# Patient Record
Sex: FEMALE | Race: OTHER | NOT HISPANIC OR LATINO
[De-identification: names, ages, dates, MRNs, and addresses within clinical notes are randomized per-mention and may not be internally consistent; named-entity substitution may affect disease eponyms.]

---

## 2018-04-10 ENCOUNTER — TRANSCRIPTION ENCOUNTER (OUTPATIENT)
Age: 43
End: 2018-04-10

## 2018-11-28 ENCOUNTER — EMERGENCY (EMERGENCY)
Facility: HOSPITAL | Age: 43
LOS: 1 days | Discharge: HOME | End: 2018-11-28
Attending: EMERGENCY MEDICINE
Payer: COMMERCIAL

## 2018-11-28 VITALS
RESPIRATION RATE: 16 BRPM | OXYGEN SATURATION: 100 % | TEMPERATURE: 98 F | WEIGHT: 156.09 LBS | HEART RATE: 75 BPM | SYSTOLIC BLOOD PRESSURE: 109 MMHG | DIASTOLIC BLOOD PRESSURE: 71 MMHG

## 2018-11-28 PROCEDURE — 99284 EMERGENCY DEPT VISIT MOD MDM: CPT | Mod: 25

## 2018-11-28 PROCEDURE — 99053 MED SERV 10PM-8AM 24 HR FAC: CPT
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
## 2018-11-29 PROCEDURE — 99284 EMERGENCY DEPT VISIT MOD MDM: CPT | Mod: 25

## 2018-11-29 PROCEDURE — 72125 CT NECK SPINE W/O DYE: CPT | Mod: 26

## 2018-11-29 PROCEDURE — 73562 X-RAY EXAM OF KNEE 3: CPT

## 2018-11-29 PROCEDURE — 73562 X-RAY EXAM OF KNEE 3: CPT | Mod: 26,RT

## 2018-11-29 PROCEDURE — 72125 CT NECK SPINE W/O DYE: CPT

## 2018-11-29 RX ORDER — IBUPROFEN 200 MG
400 TABLET ORAL ONCE
Qty: 0 | Refills: 0 | Status: COMPLETED | OUTPATIENT
Start: 2018-11-29 | End: 2018-11-29

## 2018-11-29 RX ADMIN — Medication 400 MILLIGRAM(S): at 00:15

## 2018-11-29 NOTE — ED PROVIDER NOTE - PHYSICAL EXAMINATION
GENERAL: AAOx4, GCS 15, NAD, WDWN; HEENT: MMM, no jugular venous distension, mild c-spine tender to palpation midline c6/7, also at L paracervical musculature.  Mild TTP at L trap, PERRLA, EOMI, nonicteric sclera; PULM: CTA B, no crackles/rubs/rales; CV: RRR, S1S2, no MRG; ABD: Flat abdomen, NTND, no R/G/R, no CVAT.  MSK: SMITH, +2 pulses x4;  NEURO: No obvious focal deficits; PSYCH: AAOx3, clear thought and normal sensorium.
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
## 2018-11-29 NOTE — ED ADULT NURSE NOTE - CHPI ED NUR SYMPTOMS NEG
no difficulty bearing weight/no disorientation/no fussiness/no bruising/no sleeping issues/no crying/no decreased eating/drinking/no dizziness/no loss of consciousness/no laceration/no acting out behaviors

## 2019-03-25 PROBLEM — Z00.00 ENCOUNTER FOR PREVENTIVE HEALTH EXAMINATION: Status: ACTIVE | Noted: 2019-03-25

## 2019-04-10 ENCOUNTER — APPOINTMENT (OUTPATIENT)
Dept: NEUROLOGY | Facility: CLINIC | Age: 44
End: 2019-04-10
Payer: COMMERCIAL

## 2019-04-10 VITALS
BODY MASS INDEX: 24.43 KG/M2 | DIASTOLIC BLOOD PRESSURE: 68 MMHG | WEIGHT: 152 LBS | HEIGHT: 66 IN | HEART RATE: 85 BPM | SYSTOLIC BLOOD PRESSURE: 120 MMHG

## 2019-04-10 DIAGNOSIS — G43.709 CHRONIC MIGRAINE W/OUT AURA, NOT INTRACTABLE, W/OUT STATUS MIGRAINOSUS: ICD-10-CM

## 2019-04-10 DIAGNOSIS — R51 HEADACHE: ICD-10-CM

## 2019-04-10 DIAGNOSIS — G24.3 SPASMODIC TORTICOLLIS: ICD-10-CM

## 2019-04-10 PROCEDURE — 99204 OFFICE O/P NEW MOD 45 MIN: CPT

## 2019-04-10 NOTE — PHYSICAL EXAM
[Oriented To Time, Place, And Person] : oriented to person, place, and time [General Appearance - In No Acute Distress] : in no acute distress [General Appearance - Alert] : alert [Person] : oriented to person [Place] : oriented to place [Impaired Insight] : insight and judgment were intact [Visual Intact] : visual attention was ~T not ~L decreased [Time] : oriented to time [Comprehension] : comprehension intact [Fluency] : fluency intact [Writing A Sentence] : no difficulty writing a sentence [Past History] : adequate knowledge of personal past history [Reading] : reading intact [Current Events] : adequate knowledge of current events [Vocabulary] : adequate range of vocabulary [Cranial Nerves Oculomotor (III)] : extraocular motion intact [Cranial Nerves Optic (II)] : visual acuity intact bilaterally,  visual fields full to confrontation, pupils equal round and reactive to light [Cranial Nerves Vestibulocochlear (VIII)] : hearing was intact bilaterally [Cranial Nerves Facial (VII)] : face symmetrical [Cranial Nerves Trigeminal (V)] : facial sensation intact symmetrically [Cranial Nerves Glossopharyngeal (IX)] : tongue and palate midline [Cranial Nerves Accessory (XI - Cranial And Spinal)] : head turning and shoulder shrug symmetric [Motor Tone] : muscle tone was normal in all four extremities [Cranial Nerves Hypoglossal (XII)] : there was no tongue deviation with protrusion [Involuntary Movements] : no involuntary movements were seen [Motor Handedness Right-Handed] : the patient is right hand dominant [Motor Strength] : muscle strength was normal in all four extremities [Sensation Tactile Decrease] : light touch was intact [Paresis Pronator Drift Right-Sided] : no pronator drift on the right [Paresis Pronator Drift Left-Sided] : no pronator drift on the left [Abnormal Walk] : normal gait [Sensation Pain / Temperature Decrease] : pain and temperature was intact [Limited Balance] : the patient's balance was impaired [Dysdiadochokinesia Bilaterally] : not present [Coordination - Dysmetria Impaired Finger-to-Nose Bilateral] : not present [2+] : Ankle jerk left 2+ [Sclera] : the sclera and conjunctiva were normal [PERRL With Normal Accommodation] : pupils were equal in size, round, reactive to light, with normal accommodation [Hearing Threshold Finger Rub Not Starr] : hearing was normal [Outer Ear] : the ears and nose were normal in appearance [Extraocular Movements] : extraocular movements were intact [Arterial Pulses Carotid] : carotid pulses were normal with no bruits [Edema] : there was no peripheral edema [Veins - Varicosity Changes] : there were no varicosital changes [FreeTextEntry1] : severely limited range of motion of all directions,  diffusely tender to palpation. No neck, slight, and dural, lateral right shift.

## 2019-04-10 NOTE — REVIEW OF SYSTEMS
[Neck Pain] : neck pain [As Noted in HPI] : as noted in HPI [de-identified] : intermittent numbness of both hands, especially at night [Negative] : Heme/Lymph
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
## 2019-04-10 NOTE — ASSESSMENT
[FreeTextEntry1] : 43-year-old woman with daily headaches, severe neck pain, limitation of range of motion. History of headaches, now increasing.\par Possibly transform migraines, questionable cervicogenic headaches. Rule out intracranial process.\par Spasmodic torticollis.\par Plan: Obtain MRI of the brain without contrast.\par Start Pamelor 25 mg at bedtime.\par Maxalt 10 mg p.o. p.r.n. headache, can repeat in 2 hours.\par Request authorization for Botox 200 units, every 3 months, injected. Muscles of the head bilaterally, for torticollis.

## 2019-05-03 ENCOUNTER — TRANSCRIPTION ENCOUNTER (OUTPATIENT)
Age: 44
End: 2019-05-03

## 2019-05-07 ENCOUNTER — APPOINTMENT (OUTPATIENT)
Dept: NEUROLOGY | Facility: CLINIC | Age: 44
End: 2019-05-07

## 2019-05-15 ENCOUNTER — APPOINTMENT (OUTPATIENT)
Dept: NEUROLOGY | Facility: CLINIC | Age: 44
End: 2019-05-15
Payer: COMMERCIAL

## 2019-05-15 PROCEDURE — 64615 CHEMODENERV MUSC MIGRAINE: CPT

## 2019-05-15 NOTE — HISTORY OF PRESENT ILLNESS
[FreeTextEntry1] : 43-year-old woman with a history of chronic migraines, here for first schedule Botox treatment.

## 2019-05-15 NOTE — PHYSICAL EXAM
[General Appearance - In No Acute Distress] : in no acute distress [General Appearance - Alert] : alert [Person] : oriented to person [Time] : oriented to time [Place] : oriented to place [Fluency] : fluency intact [Visual Intact] : visual attention was ~T not ~L decreased [Writing A Sentence] : no difficulty writing a sentence [Current Events] : adequate knowledge of current events [Comprehension] : comprehension intact [Reading] : reading intact [Vocabulary] : adequate range of vocabulary [Cranial Nerves Optic (II)] : visual acuity intact bilaterally,  visual fields full to confrontation, pupils equal round and reactive to light [Past History] : adequate knowledge of personal past history [Cranial Nerves Facial (VII)] : face symmetrical [Cranial Nerves Oculomotor (III)] : extraocular motion intact [Cranial Nerves Trigeminal (V)] : facial sensation intact symmetrically [Cranial Nerves Glossopharyngeal (IX)] : tongue and palate midline [Cranial Nerves Accessory (XI - Cranial And Spinal)] : head turning and shoulder shrug symmetric [Cranial Nerves Vestibulocochlear (VIII)] : hearing was intact bilaterally [Motor Strength] : muscle strength was normal in all four extremities [Cranial Nerves Hypoglossal (XII)] : there was no tongue deviation with protrusion [Motor Tone] : muscle tone was normal in all four extremities [Motor Handedness Right-Handed] : the patient is right hand dominant [Involuntary Movements] : no involuntary movements were seen [Paresis Pronator Drift Right-Sided] : no pronator drift on the right [Sensation Pain / Temperature Decrease] : pain and temperature was intact [Sensation Tactile Decrease] : light touch was intact [Paresis Pronator Drift Left-Sided] : no pronator drift on the left [Abnormal Walk] : normal gait [Dysdiadochokinesia Bilaterally] : not present [Limited Balance] : the patient's balance was impaired [Coordination - Dysmetria Impaired Finger-to-Nose Bilateral] : not present [2+] : Ankle jerk left 2+

## 2019-05-15 NOTE — ASSESSMENT
[FreeTextEntry1] : 43-year-old woman with a history of chronic migraines, status post first Botox treatment.\par Reviewed and discussed present management.\par Advice to maintain a headache diary.\par Scheduled next Botox treatment in 3 months

## 2019-06-03 ENCOUNTER — APPOINTMENT (OUTPATIENT)
Dept: NEUROLOGY | Facility: CLINIC | Age: 44
End: 2019-06-03
Payer: COMMERCIAL

## 2019-06-03 VITALS
BODY MASS INDEX: 24.43 KG/M2 | HEIGHT: 66 IN | HEART RATE: 84 BPM | SYSTOLIC BLOOD PRESSURE: 118 MMHG | DIASTOLIC BLOOD PRESSURE: 72 MMHG | WEIGHT: 152 LBS

## 2019-06-03 DIAGNOSIS — G89.29 CERVICALGIA: ICD-10-CM

## 2019-06-03 DIAGNOSIS — M54.2 CERVICALGIA: ICD-10-CM

## 2019-06-03 PROCEDURE — 20553 NJX 1/MLT TRIGGER POINTS 3/>: CPT

## 2019-06-03 PROCEDURE — 99213 OFFICE O/P EST LOW 20 MIN: CPT | Mod: 25

## 2019-06-03 NOTE — PROCEDURE
[FreeTextEntry1] : trigger point injections performed after obtaining verbal consent. A mixture of lidocaine 1% with epinephrine 2 cc, Kenalog 40 mg per cc, 0.5 cc. Using aseptic technique. 0.25 cc injected into the right upper middle trapezius, levator, scapula, right rhomboid.\par Tolerated well. No complications noted.

## 2019-06-03 NOTE — ASSESSMENT
[FreeTextEntry1] : 43-year-old woman with chronic headaches, status post Botox, we'll reevaluate in 2 months.\par Chronic neck pain, stiffness, spasm.myofascial pain syndrome.\par We'll refer to physical therapy for massage therapy.\par Performed trigger point injection, per reports obtained.

## 2019-06-03 NOTE — HISTORY OF PRESENT ILLNESS
[FreeTextEntry1] : 43-year-old woman, history of chronic migraines, chronic neck pain. MRIs of the brain was unremarkable, MRI of the cervical spine demonstrated multilevel degenerative disc disease.\par Had Botox for the first time 15 days ago,headaches show mildly improved, neck pain, though still persists, more on the right and left Radiates into the shoulder area.

## 2019-06-03 NOTE — PHYSICAL EXAM
[General Appearance - Alert] : alert [General Appearance - In No Acute Distress] : in no acute distress [Person] : oriented to person [Time] : oriented to time [Place] : oriented to place [Comprehension] : comprehension intact [Fluency] : fluency intact [Past History] : adequate knowledge of personal past history [Current Events] : adequate knowledge of current events [Cranial Nerves Oculomotor (III)] : extraocular motion intact [Cranial Nerves Optic (II)] : visual acuity intact bilaterally,  visual fields full to confrontation, pupils equal round and reactive to light [Vocabulary] : adequate range of vocabulary [Cranial Nerves Trigeminal (V)] : facial sensation intact symmetrically [Cranial Nerves Vestibulocochlear (VIII)] : hearing was intact bilaterally [Cranial Nerves Facial (VII)] : face symmetrical [Cranial Nerves Glossopharyngeal (IX)] : tongue and palate midline [Cranial Nerves Accessory (XI - Cranial And Spinal)] : head turning and shoulder shrug symmetric [Cranial Nerves Hypoglossal (XII)] : there was no tongue deviation with protrusion [Motor Tone] : muscle tone was normal in all four extremities [Motor Strength] : muscle strength was normal in all four extremities [Paresis Pronator Drift Right-Sided] : no pronator drift on the right [Involuntary Movements] : no involuntary movements were seen [Motor Handedness Right-Handed] : the patient is right hand dominant [Sensation Tactile Decrease] : light touch was intact [Paresis Pronator Drift Left-Sided] : no pronator drift on the left [Sensation Pain / Temperature Decrease] : pain and temperature was intact [Abnormal Walk] : normal gait [Coordination - Dysmetria Impaired Finger-to-Nose Bilateral] : not present [Dysdiadochokinesia Bilaterally] : not present [Limited Balance] : the patient's balance was impaired [2+] : Ankle jerk left 2+ [FreeTextEntry1] : diffuse muscle tightness and spasm across the upper back, patient in the rightupper, middle trapezius, levator, scapula, rhomboid.

## 2019-06-11 ENCOUNTER — APPOINTMENT (OUTPATIENT)
Dept: NEUROLOGY | Facility: CLINIC | Age: 44
End: 2019-06-11
Payer: COMMERCIAL

## 2019-06-11 VITALS
BODY MASS INDEX: 24.91 KG/M2 | DIASTOLIC BLOOD PRESSURE: 68 MMHG | WEIGHT: 155 LBS | SYSTOLIC BLOOD PRESSURE: 110 MMHG | HEART RATE: 75 BPM | HEIGHT: 66 IN

## 2019-06-11 PROCEDURE — 20553 NJX 1/MLT TRIGGER POINTS 3/>: CPT

## 2019-06-11 PROCEDURE — 99214 OFFICE O/P EST MOD 30 MIN: CPT | Mod: 25

## 2019-06-11 RX ORDER — CYCLOBENZAPRINE HYDROCHLORIDE 5 MG/1
5 TABLET, FILM COATED ORAL
Qty: 15 | Refills: 0 | Status: ACTIVE | COMMUNITY
Start: 2019-06-11 | End: 1900-01-01

## 2019-06-11 RX ORDER — MELOXICAM 7.5 MG/1
7.5 TABLET ORAL DAILY
Qty: 60 | Refills: 0 | Status: ACTIVE | COMMUNITY
Start: 2019-06-11 | End: 1900-01-01

## 2019-06-11 NOTE — DISCUSSION/SUMMARY
[FreeTextEntry1] : 43 year old woman c/o worsening right upper back, neck pain. Spasm.\par Plan: MR cervical spine r/o worsening  DDD, spondylosis.\par Mobic 7.5 mg, 1-2 tabs daily\par Flexeril 5 mg po HS\par Trigger point injections right upper back. Patient gives her consent.\par \par

## 2019-06-11 NOTE — PHYSICAL EXAM
[General Appearance - In No Acute Distress] : in no acute distress [General Appearance - Alert] : alert [Cranial Nerves Optic (II)] : visual acuity intact bilaterally,  visual fields full to confrontation, pupils equal round and reactive to light [Cranial Nerves Oculomotor (III)] : extraocular motion intact [Cranial Nerves Trigeminal (V)] : facial sensation intact symmetrically [Cranial Nerves Facial (VII)] : face symmetrical [Cranial Nerves Vestibulocochlear (VIII)] : hearing was intact bilaterally [Cranial Nerves Glossopharyngeal (IX)] : tongue and palate midline [Cranial Nerves Accessory (XI - Cranial And Spinal)] : head turning and shoulder shrug symmetric [Cranial Nerves Hypoglossal (XII)] : there was no tongue deviation with protrusion [Motor Strength] : muscle strength was normal in all four extremities [Motor Handedness Right-Handed] : the patient is right hand dominant [Normal] : Normal [C-Spine ___ (level)] : ~Ulevel [unfilled] cervical spine [Right Paraspinal ___ (level)] : ~Ulevel [unfilled] right paraspinal [Muscle Spasms, Left] : no left-sided muscle spasms [Muscle Spasms, Right] : right-sided muscle spasms [5] : C5 [7] : C7 [6] : C6 [Pain] : was painful [Restricted] : was restricted

## 2019-06-11 NOTE — HISTORY OF PRESENT ILLNESS
[FreeTextEntry1] : 43 year old woman hx of chronic migraine, chronic neck pain, recently had Botox treatment for her pains. has persistent right upper back midline pain. Worse as he day progresses. Neck feels stiff. No fever or chills.\par Pain is worse with activity, bests after sleep. Non radiating to extremities, no difficulty walking, balance, no change in B/B habits.

## 2019-07-03 ENCOUNTER — APPOINTMENT (OUTPATIENT)
Dept: NEUROLOGY | Facility: CLINIC | Age: 44
End: 2019-07-03
Payer: COMMERCIAL

## 2019-07-03 VITALS
BODY MASS INDEX: 24.91 KG/M2 | SYSTOLIC BLOOD PRESSURE: 102 MMHG | HEIGHT: 66 IN | HEART RATE: 82 BPM | WEIGHT: 155 LBS | DIASTOLIC BLOOD PRESSURE: 56 MMHG

## 2019-07-03 PROCEDURE — 99213 OFFICE O/P EST LOW 20 MIN: CPT | Mod: 25

## 2019-07-03 PROCEDURE — 20553 NJX 1/MLT TRIGGER POINTS 3/>: CPT

## 2019-07-03 NOTE — PROCEDURE
[FreeTextEntry1] : Trigger point injection using aseptic technique, after obtaining verbal consent. A mixture of lidocaine 1% wo epi 2 cc plus Kenalog 40 mg/cc, 0.5 cc in the right elevator scapulae, upper and mid trapezius. Tolerated well.\par Bleeding controlled with local pressure.

## 2019-07-03 NOTE — ASSESSMENT
[FreeTextEntry1] : 43 year old woman hx of migraine headaches, doing better.\par Recurrent neck pain and stiffness. Improving with massage therapy.\par Discussed options. Will do trigger point injections.\par HAs f/u appointment in August.

## 2019-07-03 NOTE — PHYSICAL EXAM
[General Appearance - Alert] : alert [General Appearance - In No Acute Distress] : in no acute distress [Person] : oriented to person [Time] : oriented to time [Place] : oriented to place [Fluency] : fluency intact [Cranial Nerves Oculomotor (III)] : extraocular motion intact [Cranial Nerves Optic (II)] : visual acuity intact bilaterally,  visual fields full to confrontation, pupils equal round and reactive to light [Cranial Nerves Trigeminal (V)] : facial sensation intact symmetrically [Cranial Nerves Facial (VII)] : face symmetrical [Cranial Nerves Vestibulocochlear (VIII)] : hearing was intact bilaterally [Cranial Nerves Accessory (XI - Cranial And Spinal)] : head turning and shoulder shrug symmetric [Cranial Nerves Glossopharyngeal (IX)] : tongue and palate midline [Cranial Nerves Hypoglossal (XII)] : there was no tongue deviation with protrusion [Motor Strength] : muscle strength was normal in all four extremities [Motor Handedness Right-Handed] : the patient is right hand dominant [FreeTextEntry1] : muscle tightness and tenderness to palaption across the upper back, specially in the right upper, middle trapezius, levator, scapula, rhomboid.

## 2019-07-03 NOTE — HISTORY OF PRESENT ILLNESS
[FreeTextEntry1] : 33-year-old woman with history of lightheaded, cervicalgia, here for followup visit, reports headaches overall are better.Still having persistent neck pain, right shoulder pain and stiffness. Going to a massage therapist, with some results. No weakness of the upper extremities, denies pain radiating down the arms, no numbness, paresthesias of the upper extremities. No change in walking, no bowel or bladder changes.

## 2019-08-07 ENCOUNTER — APPOINTMENT (OUTPATIENT)
Dept: NEUROLOGY | Facility: CLINIC | Age: 44
End: 2019-08-07
Payer: COMMERCIAL

## 2019-08-07 PROCEDURE — 64615 CHEMODENERV MUSC MIGRAINE: CPT

## 2019-08-07 NOTE — HISTORY OF PRESENT ILLNESS
[FreeTextEntry1] : 43-year-old woman, history of chronic migraines, therefore schedule Botox treatment,reports since the first treatment in May, her headaches have significantly reduced. Tolerated procedure well.

## 2019-08-07 NOTE — PHYSICAL EXAM
[General Appearance - Alert] : alert [General Appearance - In No Acute Distress] : in no acute distress [Person] : oriented to person [Place] : oriented to place [Time] : oriented to time [Fluency] : fluency intact [Cranial Nerves Optic (II)] : visual acuity intact bilaterally,  visual fields full to confrontation, pupils equal round and reactive to light [Cranial Nerves Oculomotor (III)] : extraocular motion intact [Cranial Nerves Trigeminal (V)] : facial sensation intact symmetrically [Cranial Nerves Facial (VII)] : face symmetrical [Cranial Nerves Vestibulocochlear (VIII)] : hearing was intact bilaterally [Cranial Nerves Glossopharyngeal (IX)] : tongue and palate midline [Cranial Nerves Accessory (XI - Cranial And Spinal)] : head turning and shoulder shrug symmetric [Motor Strength] : muscle strength was normal in all four extremities [Cranial Nerves Hypoglossal (XII)] : there was no tongue deviation with protrusion [Motor Handedness Right-Handed] : the patient is right hand dominant

## 2019-08-07 NOTE — ASSESSMENT
[FreeTextEntry1] : 43-year-old woman, history of chronic migraines, doing very well on Botox treatment, has noticed significant reduction in headaches.\par Reviewed present management, no changes.\par Plan: Schedule next Botox treatment in 3 months.

## 2019-11-05 ENCOUNTER — APPOINTMENT (OUTPATIENT)
Dept: NEUROLOGY | Facility: CLINIC | Age: 44
End: 2019-11-05
Payer: COMMERCIAL

## 2019-11-05 PROCEDURE — 64615 CHEMODENERV MUSC MIGRAINE: CPT

## 2019-11-05 NOTE — HISTORY OF PRESENT ILLNESS
[FreeTextEntry1] : 44-year-old woman with a history of recurrent headaches migraines, here for schedule Botox treatment. Reports significant improvement of her headaches, less neck pain. Tolerated the procedure well in the past

## 2019-11-05 NOTE — PHYSICAL EXAM
[General Appearance - Alert] : alert [General Appearance - In No Acute Distress] : in no acute distress [Person] : oriented to person [Place] : oriented to place [Time] : oriented to time [Fluency] : fluency intact [Cranial Nerves Optic (II)] : visual acuity intact bilaterally,  visual fields full to confrontation, pupils equal round and reactive to light [Cranial Nerves Oculomotor (III)] : extraocular motion intact [Cranial Nerves Trigeminal (V)] : facial sensation intact symmetrically [Cranial Nerves Facial (VII)] : face symmetrical [Cranial Nerves Vestibulocochlear (VIII)] : hearing was intact bilaterally [Cranial Nerves Glossopharyngeal (IX)] : tongue and palate midline [Cranial Nerves Accessory (XI - Cranial And Spinal)] : head turning and shoulder shrug symmetric [Cranial Nerves Hypoglossal (XII)] : there was no tongue deviation with protrusion [Motor Strength] : muscle strength was normal in all four extremities [Motor Handedness Right-Handed] : the patient is right hand dominant

## 2019-11-05 NOTE — ASSESSMENT
[FreeTextEntry1] : 44-year-old woman history of chronic migraines, tolerated Botox well. Headaches improving.\par Plan: Schedule next treatment in 3 months.

## 2019-12-04 ENCOUNTER — OUTPATIENT (OUTPATIENT)
Dept: OUTPATIENT SERVICES | Facility: HOSPITAL | Age: 44
LOS: 1 days | End: 2019-12-04
Payer: COMMERCIAL

## 2019-12-04 ENCOUNTER — APPOINTMENT (OUTPATIENT)
Dept: MAMMOGRAPHY | Facility: CLINIC | Age: 44
End: 2019-12-04
Payer: COMMERCIAL

## 2019-12-04 DIAGNOSIS — Z00.00 ENCOUNTER FOR GENERAL ADULT MEDICAL EXAMINATION WITHOUT ABNORMAL FINDINGS: ICD-10-CM

## 2019-12-04 DIAGNOSIS — Z12.31 ENCOUNTER FOR SCREENING MAMMOGRAM FOR MALIGNANT NEOPLASM OF BREAST: ICD-10-CM

## 2019-12-04 PROCEDURE — 77063 BREAST TOMOSYNTHESIS BI: CPT

## 2019-12-04 PROCEDURE — 77063 BREAST TOMOSYNTHESIS BI: CPT | Mod: 26

## 2019-12-04 PROCEDURE — 77067 SCR MAMMO BI INCL CAD: CPT

## 2019-12-04 PROCEDURE — 77067 SCR MAMMO BI INCL CAD: CPT | Mod: 26

## 2020-02-19 ENCOUNTER — APPOINTMENT (OUTPATIENT)
Dept: NEUROLOGY | Facility: CLINIC | Age: 45
End: 2020-02-19
Payer: COMMERCIAL

## 2020-02-19 PROCEDURE — 64615 CHEMODENERV MUSC MIGRAINE: CPT

## 2020-02-19 NOTE — PHYSICAL EXAM
[General Appearance - In No Acute Distress] : in no acute distress [General Appearance - Alert] : alert [Oriented To Time, Place, And Person] : oriented to person, place, and time [Impaired Insight] : insight and judgment were intact [Affect] : the affect was normal

## 2020-02-19 NOTE — HISTORY OF PRESENT ILLNESS
[FreeTextEntry1] : 44 year old woman hx of recurrent headaches, migraines, neck pain, here for scheduled Botox appointment. Doing well, since on Botox, occasional headaches, maybe twice per month.\par

## 2020-02-19 NOTE — ASSESSMENT
[FreeTextEntry1] : 44 year old woman hx of chronic migraine, improved. tolerated procedure.\par plan: schedule next treatment in 3 months.

## 2020-07-22 ENCOUNTER — APPOINTMENT (OUTPATIENT)
Dept: NEUROLOGY | Facility: CLINIC | Age: 45
End: 2020-07-22
Payer: COMMERCIAL

## 2020-07-22 PROCEDURE — 99213 OFFICE O/P EST LOW 20 MIN: CPT | Mod: 25

## 2020-07-22 PROCEDURE — 64615 CHEMODENERV MUSC MIGRAINE: CPT

## 2020-07-22 NOTE — HISTORY OF PRESENT ILLNESS
[FreeTextEntry1] : 44 year old woman hx of chronic migraine headaches, here for scheduled Botox appointment. last treatment was 5 months ago, due to corona virus emergency,  had to reschedule. headaches since hasve been worse, did much better while on Botox. treatments.\par Complains of chronic neck pain, stiffness, worse, no trauma or injry, non radiating. no weakness of the  arms or legs.\par Previous evaluations included MRI of the C-spine in 2018, results in system, showed mx level disc disease.

## 2020-07-22 NOTE — REVIEW OF SYSTEMS
[As Noted in HPI] : as noted in HPI [Neck Pain] : neck pain [Negative] : Psychiatric [FreeTextEntry9] : chronic neck pain

## 2020-07-22 NOTE — ASSESSMENT
[FreeTextEntry1] : 44 year old woman hx of chronic migraine, doing better on botox. Tolerated procedure. \par schedule next treatment in 3 months.\par hx of chronic neck pain, previous imaging showed mx level DDD, discussed options. refer to neurosurgery.\par

## 2020-11-17 ENCOUNTER — APPOINTMENT (OUTPATIENT)
Dept: NEUROLOGY | Facility: CLINIC | Age: 45
End: 2020-11-17
Payer: COMMERCIAL

## 2020-11-17 PROCEDURE — 64615 CHEMODENERV MUSC MIGRAINE: CPT

## 2020-11-17 NOTE — ASSESSMENT
[FreeTextEntry1] : 45 year old woman hx of chronic migraine, doing well.\[facility]

## 2020-11-17 NOTE — HISTORY OF PRESENT ILLNESS
[FreeTextEntry1] : 45 year old woman hx of chronic migraine here for Botox procedure. Reports her headaches have responded well to the treatment.\par

## 2021-02-17 ENCOUNTER — APPOINTMENT (OUTPATIENT)
Dept: NEUROLOGY | Facility: CLINIC | Age: 46
End: 2021-02-17
Payer: COMMERCIAL

## 2021-02-17 PROCEDURE — 99072 ADDL SUPL MATRL&STAF TM PHE: CPT

## 2021-02-17 PROCEDURE — 64615 CHEMODENERV MUSC MIGRAINE: CPT

## 2021-02-17 NOTE — ASSESSMENT
[FreeTextEntry1] : 45 year old woman hx of chronic migraine doing well. \par schedule next treatment in 3 months.
Airway patent, Nasal mucosa clear. Mouth with normal mucosa. Throat has no vesicles, no oropharyngeal exudates and uvula is midline.

## 2021-03-17 ENCOUNTER — APPOINTMENT (OUTPATIENT)
Dept: ULTRASOUND IMAGING | Facility: CLINIC | Age: 46
End: 2021-03-17

## 2021-03-17 ENCOUNTER — APPOINTMENT (OUTPATIENT)
Dept: MAMMOGRAPHY | Facility: CLINIC | Age: 46
End: 2021-03-17

## 2021-04-01 ENCOUNTER — APPOINTMENT (OUTPATIENT)
Dept: NEUROLOGY | Facility: CLINIC | Age: 46
End: 2021-04-01
Payer: COMMERCIAL

## 2021-04-01 VITALS
BODY MASS INDEX: 24.91 KG/M2 | DIASTOLIC BLOOD PRESSURE: 60 MMHG | HEIGHT: 66 IN | HEART RATE: 78 BPM | WEIGHT: 155 LBS | TEMPERATURE: 97.8 F | SYSTOLIC BLOOD PRESSURE: 110 MMHG

## 2021-04-01 PROCEDURE — 99214 OFFICE O/P EST MOD 30 MIN: CPT

## 2021-04-01 PROCEDURE — 99072 ADDL SUPL MATRL&STAF TM PHE: CPT

## 2021-04-01 NOTE — PHYSICAL EXAM
[General Appearance - Alert] : alert [General Appearance - In No Acute Distress] : in no acute distress [Oriented To Time, Place, And Person] : oriented to person, place, and time [Impaired Insight] : insight and judgment were intact [Affect] : the affect was normal [Person] : oriented to person [Place] : oriented to place [Time] : oriented to time [Cranial Nerves Optic (II)] : visual acuity intact bilaterally,  visual fields full to confrontation, pupils equal round and reactive to light [Cranial Nerves Oculomotor (III)] : extraocular motion intact [Cranial Nerves Facial (VII)] : face symmetrical [Cranial Nerves Vestibulocochlear (VIII)] : hearing was intact bilaterally [Cranial Nerves Accessory (XI - Cranial And Spinal)] : head turning and shoulder shrug symmetric [Motor Strength] : muscle strength was normal in all four extremities [Motor Handedness Right-Handed] : the patient is right hand dominant [Sensation Tactile Decrease] : light touch was intact [Normal] : Normal [None Except as Noted] : None except the [Left Trapezius Muscle] : left trapezius muscle [Restricted] : was restricted [Pain] : was painless [Abnormal Walk] : normal gait [Nail Clubbing] : no clubbing  or cyanosis of the fingernails

## 2021-04-01 NOTE — DATA REVIEWED
[FreeTextEntry1] : MRI of the cervical spine, performed March 18, 2021. Impression: C3/C4, C4/C5, C5/C6, and C6/C7 disc herniation deforming the thecal sac,with C4/C5 and C5-C6 cord abutment. C4/C5 right neural foraminal extension, C5/6 bilateral neuroforaminal extension and C6/C7 left neural foraminal extension abutting the exiting nerve roots, C4-5, right neural foraminal narrowing, C5/6 bilateral neural foraminal narrowing, and C6/7 left neuro foramina narrowing conjunction with facet and no significant hypertrophic changes.

## 2021-04-01 NOTE — ASSESSMENT
[FreeTextEntry1] : 45-year-old with complaint of midline base of the neck pain,burning sensations, unremarkable exam, recent MRI demonstrating multilevel disc degenerative changes. No radicular complaints. Possibly musculoskeletal.\par Plan: Will referr for medical massage.\par Refer for neurosurgical opinion.\par Advised on proper posture, strengthening the upper back muscles.\par has an appointment for Botox coming in May.\par

## 2021-04-01 NOTE — HISTORY OF PRESENT ILLNESS
[FreeTextEntry1] : 45 year old woman c/o chronic neck pain, worse at the base, feels burning, worse when sitting, looking down. Does not radiate to the arms, no paresthesias of the hands or arms.history of falls or injury,nitroglycerin fever or chills, nausea malignancy. Pain tends to be worse toward the end of the day, at times has difficulty sleeping because of this neck pain.

## 2021-06-07 ENCOUNTER — APPOINTMENT (OUTPATIENT)
Dept: NEUROLOGY | Facility: CLINIC | Age: 46
End: 2021-06-07
Payer: COMMERCIAL

## 2021-06-07 PROCEDURE — 64615 CHEMODENERV MUSC MIGRAINE: CPT

## 2021-06-07 PROCEDURE — 99072 ADDL SUPL MATRL&STAF TM PHE: CPT

## 2021-06-07 NOTE — HISTORY OF PRESENT ILLNESS
[FreeTextEntry1] : 45-year-old woman with history of chronic migraines, here for schedule Botox appointment.

## 2021-06-07 NOTE — ASSESSMENT
[FreeTextEntry1] : 45-year-old woman history of chronic migraines, stable, doing well on Botox.\par Plan: Schedule next Botox treatment in 3 months.

## 2021-09-16 ENCOUNTER — APPOINTMENT (OUTPATIENT)
Dept: NEUROLOGY | Facility: CLINIC | Age: 46
End: 2021-09-16
Payer: COMMERCIAL

## 2021-09-16 PROCEDURE — 64615 CHEMODENERV MUSC MIGRAINE: CPT

## 2021-09-16 PROCEDURE — 99072 ADDL SUPL MATRL&STAF TM PHE: CPT

## 2021-09-16 RX ORDER — NORTRIPTYLINE HYDROCHLORIDE 25 MG/1
25 CAPSULE ORAL
Qty: 30 | Refills: 2 | Status: DISCONTINUED | COMMUNITY
Start: 2019-04-10 | End: 2021-09-16

## 2021-09-16 NOTE — HISTORY OF PRESENT ILLNESS
[FreeTextEntry1] : 45-year-old woman with history of chronic migraines here for schedule Botox appointment. Reports continued to do well, headache significantly improved.  No new medical problems.

## 2021-09-16 NOTE — ASSESSMENT
[FreeTextEntry1] : 45-year-old right-handed woman history of chronic migraines, doing well on current therapy with Botox. Tolerated procedure.\par Plan: Schedule next Botox treatment in 3 months

## 2021-12-22 ENCOUNTER — APPOINTMENT (OUTPATIENT)
Dept: NEUROLOGY | Facility: CLINIC | Age: 46
End: 2021-12-22
Payer: COMMERCIAL

## 2021-12-22 PROCEDURE — 99072 ADDL SUPL MATRL&STAF TM PHE: CPT

## 2021-12-22 PROCEDURE — 64615 CHEMODENERV MUSC MIGRAINE: CPT

## 2021-12-22 NOTE — HISTORY OF PRESENT ILLNESS
[FreeTextEntry1] : 46 year old woman here for scheduled Botox appointment, headaches continue under control.\par

## 2022-03-22 ENCOUNTER — APPOINTMENT (OUTPATIENT)
Dept: NEUROLOGY | Facility: CLINIC | Age: 47
End: 2022-03-22
Payer: COMMERCIAL

## 2022-03-22 PROCEDURE — 99072 ADDL SUPL MATRL&STAF TM PHE: CPT

## 2022-03-22 PROCEDURE — 64615 CHEMODENERV MUSC MIGRAINE: CPT

## 2022-03-22 NOTE — ASSESSMENT
[FreeTextEntry1] : 46 -year-old woman, history of chronic migraines,status post Botox therapy, tolerated procedure well. Stable, headaches under better control.\par Plan: Schedule next Botox treatment, in 3 months

## 2022-03-22 NOTE — PROCEDURE
[Chronic migraine] : Chronic migraine [Consent Signed] : consent signed [Adverse Effects] : no adverse effects [Continue Current Treatment] : continue current treatment

## 2022-07-01 ENCOUNTER — APPOINTMENT (OUTPATIENT)
Dept: NEUROLOGY | Facility: CLINIC | Age: 47
End: 2022-07-01

## 2022-07-08 ENCOUNTER — APPOINTMENT (OUTPATIENT)
Dept: NEUROLOGY | Facility: CLINIC | Age: 47
End: 2022-07-08

## 2022-07-08 PROCEDURE — 64615 CHEMODENERV MUSC MIGRAINE: CPT

## 2022-07-08 PROCEDURE — 99072 ADDL SUPL MATRL&STAF TM PHE: CPT

## 2022-07-08 NOTE — ASSESSMENT
[FreeTextEntry1] : 46-year-old woman history of chronic migraines, status post Botox therapy, well-tolerated.  Stable, has noticed significant improvement since starting treatment.\par Plan: Schedule next Botox therapy, in 3 months

## 2022-07-08 NOTE — HISTORY OF PRESENT ILLNESS
[FreeTextEntry1] : 46-year-old woman history of chronic migraine here for scheduled Botox appointment, reports doing pretty well, headaches under well control.

## 2022-11-03 ENCOUNTER — APPOINTMENT (OUTPATIENT)
Dept: NEUROLOGY | Facility: CLINIC | Age: 47
End: 2022-11-03

## 2022-11-03 PROCEDURE — 99213 OFFICE O/P EST LOW 20 MIN: CPT | Mod: 25

## 2022-11-03 PROCEDURE — 99072 ADDL SUPL MATRL&STAF TM PHE: CPT

## 2022-11-03 PROCEDURE — 64615 CHEMODENERV MUSC MIGRAINE: CPT

## 2022-11-03 NOTE — DATA REVIEWED
[FreeTextEntry1] : MRI of the brain without contrast performed April 11, 2019, read as no evidence of intracranial pathology.\par MRI of the cervical spine, performed March 18, 2021 demonstrated multilevel degenerative changes, spondylosis, foraminal stenosis.

## 2022-11-03 NOTE — HISTORY OF PRESENT ILLNESS
[FreeTextEntry1] : 47-year-old woman history of chronic migraine here for f/u visit, and scheduled Botox appointment.\par Patient reports continued doing fairly well, no major headaches, occasional minor headaches.  Has Maxalt 10 mg to take as needed.\par History of chronic neck pain, improved.  No radiation to the arms.  No  change in balance, no bowel bladder dysfunction.\par

## 2023-03-07 ENCOUNTER — APPOINTMENT (OUTPATIENT)
Dept: NEUROLOGY | Facility: CLINIC | Age: 48
End: 2023-03-07
Payer: COMMERCIAL

## 2023-03-07 VITALS
SYSTOLIC BLOOD PRESSURE: 105 MMHG | TEMPERATURE: 97.8 F | DIASTOLIC BLOOD PRESSURE: 74 MMHG | HEIGHT: 66 IN | WEIGHT: 147 LBS | BODY MASS INDEX: 23.63 KG/M2 | HEART RATE: 88 BPM

## 2023-03-07 PROCEDURE — 99072 ADDL SUPL MATRL&STAF TM PHE: CPT

## 2023-03-07 PROCEDURE — 64615 CHEMODENERV MUSC MIGRAINE: CPT

## 2023-03-07 PROCEDURE — 99213 OFFICE O/P EST LOW 20 MIN: CPT | Mod: 25

## 2023-03-07 RX ORDER — RIZATRIPTAN BENZOATE 10 MG/1
10 TABLET ORAL
Qty: 12 | Refills: 5 | Status: ACTIVE | COMMUNITY
Start: 2019-04-10 | End: 1900-01-01

## 2023-03-07 NOTE — PROCEDURE
[Chronic migraine] : Chronic migraine [Adverse Effects] : no adverse effects [Consent Signed] : consent signed [Continue Current Treatment] : continue current treatment [BOTOX 200 Units] : BOTOX 200 units; 5 units per muscle site.  procerus x 1, corragator x 2, frontalis x 4, temporalis x 8, occipitalis x 6, cervical  paraspinal x 4 and trapezius x 6

## 2023-03-07 NOTE — ASSESSMENT
[FreeTextEntry1] : 47-year-old woman history of chronic migraines here for follow-up visit doing very well, tolerated Botox well.\par Using rizatriptan 10 mg for rescue, with excellent response. Prescription sent to pharmacy.\par Renewal of Botox therapy.  200 units injected muscle of the head and neck every 3 months 20 minutes.\par Return to office, 3 months.

## 2023-03-07 NOTE — HISTORY OF PRESENT ILLNESS
[FreeTextEntry1] : 47-year-old woman with a history of chronic migraines here for follow-up visit, scheduled appointment.  Reports continued doing well, no significant headaches over the last several weeks.  Occasional less severe headaches, was a pressure heaviness.  Continues using rizatriptan 10 mg as needed usually with excellent response.\par No new medical problems, no recent hospitalizations.  Getting ready for holiday in [place] visiting her mother, for her birthday.\par

## 2023-04-23 NOTE — PROCEDURE
[FreeTextEntry1] : Trigger point injection with lidocaine 1%% wo epi 2 cc plus Kenalog 40 mg/CC, 0.5 cc; injected in the right upper and mid trapezius, elevator scapulae. Tolerated well, no complications. Admission

## 2023-06-07 ENCOUNTER — APPOINTMENT (OUTPATIENT)
Dept: NEUROLOGY | Facility: CLINIC | Age: 48
End: 2023-06-07
Payer: COMMERCIAL

## 2023-06-07 PROCEDURE — 99213 OFFICE O/P EST LOW 20 MIN: CPT | Mod: 25

## 2023-06-07 PROCEDURE — 64615 CHEMODENERV MUSC MIGRAINE: CPT

## 2023-06-07 NOTE — PHYSICAL EXAM
[General Appearance - Alert] : alert [General Appearance - In No Acute Distress] : in no acute distress [Oriented To Time, Place, And Person] : oriented to person, place, and time [Impaired Insight] : insight and judgment were intact [Affect] : the affect was normal [Person] : oriented to person [Place] : oriented to place [Time] : oriented to time [Cranial Nerves Optic (II)] : visual acuity intact bilaterally,  visual fields full to confrontation, pupils equal round and reactive to light [Cranial Nerves Oculomotor (III)] : extraocular motion intact [Cranial Nerves Facial (VII)] : face symmetrical [Cranial Nerves Vestibulocochlear (VIII)] : hearing was intact bilaterally [Cranial Nerves Accessory (XI - Cranial And Spinal)] : head turning and shoulder shrug symmetric [Motor Strength] : muscle strength was normal in all four extremities [Motor Handedness Right-Handed] : the patient is right hand dominant [Sensation Tactile Decrease] : light touch was intact [Abnormal Walk] : normal gait [Dysdiadochokinesia Bilaterally] : not present

## 2023-06-07 NOTE — ASSESSMENT
[FreeTextEntry1] : 47-year-old woman history of chronic headaches, migraines, chronic neck pain stable, doing well with current therapy.  Tolerated Botox well.\par Reviewed headache management and treatment.\par Return to office, 3 months.\par
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
## 2023-06-07 NOTE — DATA REVIEWED
[FreeTextEntry1] :  EXAM:  CT CERVICAL SPINE                      \par \par \par PROCEDURE DATE:  11/29/2018  \par \par \par \par \par INTERPRETATION:  CLINICAL INFORMATION: Cervical spine trauma. neck pain\par \par MRIs of the cervical spine performed March 18, 2021 demonstrated multidisc herniation at C3-4, C4-5, C5-6 and C6-7.  Noted at C4-5 right neuroforaminal extension, at C5-C6, bilateral neuroforaminal extension at C6-C7 left neuroforaminal extension abutting the exiting nerve roots.\par \par MRI of the brain performed April 12, 2019 impression: No evidence of intracranial pathology\par \par \par COMPARISON: None available.\par \par PROCEDURE: CT cervical spine was performed without intravenous contrast. \par Coronal and sagittal reformatted images were obtained.\par \par FINDINGS:\par \par Vertebral body heights and alignment are maintained. The craniocervical \par junction and atlantoaxial interval are intact. There is no acute cervical \par spine fracture.\par \par Slight straightening and reversal of the cervical lordosis.\par \par There is mild narrowing of the intervertebral disc space heights \par predominantly at C5-C6 and C6-C7. There is no significant osseous spinal \par or neuroforaminal stenosis. \par \par There is no prevertebral soft tissue swelling. The paraspinal soft \par tissues are unremarkable.\par \par There is mild apical pleural thickening/scarring. \par \par IMPRESSION: \par \par No acute displaced fracture or traumatic subluxation.\par Mild straightening/reversal of the cervical lordosis which may be due to \par positioning/muscle spasm.\par \par

## 2023-09-28 ENCOUNTER — APPOINTMENT (OUTPATIENT)
Dept: NEUROLOGY | Facility: CLINIC | Age: 48
End: 2023-09-28
Payer: COMMERCIAL

## 2023-09-28 ENCOUNTER — TRANSCRIPTION ENCOUNTER (OUTPATIENT)
Age: 48
End: 2023-09-28

## 2023-09-28 PROCEDURE — 99213 OFFICE O/P EST LOW 20 MIN: CPT | Mod: 25

## 2023-09-28 PROCEDURE — 64615 CHEMODENERV MUSC MIGRAINE: CPT

## 2023-11-10 NOTE — ASSESSMENT
[FreeTextEntry1] : 47-year-old woman history of chronic migraines, chronic neck pain, history of spondylosis.  Fairly well, has noted significant reduction only of her headaches from her neck pain.\par Will discuss treatment options, potentially put her next treatment 4 months as opposed to 3.\par Patient will decide in the future.\par Return to office, 3 to 4 months.
No

## 2023-12-29 RX ORDER — ONABOTULINUMTOXINA 200 [USP'U]/1
200 INJECTION, POWDER, LYOPHILIZED, FOR SOLUTION INTRADERMAL; INTRAMUSCULAR
Qty: 1 | Refills: 3 | Status: ACTIVE | COMMUNITY
Start: 2019-04-10 | End: 1900-01-01

## 2024-01-22 ENCOUNTER — APPOINTMENT (OUTPATIENT)
Dept: NEUROLOGY | Facility: CLINIC | Age: 49
End: 2024-01-22
Payer: COMMERCIAL

## 2024-01-22 DIAGNOSIS — G43.909 MIGRAINE, UNSPECIFIED, NOT INTRACTABLE, W/OUT STATUS MIGRAINOSUS: ICD-10-CM

## 2024-01-22 PROCEDURE — 99214 OFFICE O/P EST MOD 30 MIN: CPT | Mod: 25

## 2024-01-22 PROCEDURE — 64615 CHEMODENERV MUSC MIGRAINE: CPT

## 2024-01-22 NOTE — ASSESSMENT
[FreeTextEntry1] : 48-year-old woman history of chronic migraines, able doing very well overall with Botox therapy.  End of treatment cycle headaches which in the past has had difficulty tolerating triptans. Review and discuss treatment options. Samples of Ubrelvy 100 mg as well as a prescription to take 1 as needed for headache, can repeat within 2 hours.  Maximum dose of 20 mg/day. Samples of Nurtec 75 mg p.o. daily as needed. Patient call for prescription if the above is helpful. Advised to maintain a headache diary. Return to the office, 3 to 4 months.

## 2024-01-22 NOTE — HISTORY OF PRESENT ILLNESS
[FreeTextEntry1] : 48-year-old woman right-handed history of chronic migraines here for follow-up visit, scheduled both appointment.  Has had very good success with Botox therapy as a preventative therapy.  Still getting a couple headaches usually toward the end of the treatment effect, so a couple weeks before she comes in for the next Botox, she starts getting headaches.  Rizatriptan 10 mg is too strong.  She will take 2-3 Advil's just to make the headaches less severe.  Occasion will have to lay down and sleep it off. No episodes of sudden loss of vision, trouble speaking, unilateral weakness or numbness

## 2024-01-23 RX ORDER — UBROGEPANT 100 MG/1
100 TABLET ORAL
Qty: 10 | Refills: 5 | Status: ACTIVE | COMMUNITY
Start: 2024-01-22 | End: 1900-01-01

## 2024-05-01 ENCOUNTER — APPOINTMENT (OUTPATIENT)
Dept: NEUROLOGY | Facility: CLINIC | Age: 49
End: 2024-05-01
Payer: COMMERCIAL

## 2024-05-01 PROCEDURE — 64615 CHEMODENERV MUSC MIGRAINE: CPT

## 2024-05-01 PROCEDURE — 99214 OFFICE O/P EST MOD 30 MIN: CPT | Mod: 25

## 2024-05-01 PROCEDURE — G2211 COMPLEX E/M VISIT ADD ON: CPT | Mod: NC,1L

## 2024-05-01 NOTE — PHYSICAL EXAM

## 2024-05-01 NOTE — HISTORY OF PRESENT ILLNESS
[FreeTextEntry1] : 48-year-old woman right-handed with a history of chronic migraines here for follow-up visit and scheduled both appointment.  Reports headaches about the same significant improvement while on Botox therapy.  Not getting on average about once a week, takes naproxen usually with fairly good resolution within a few hours.  Has tried triptans, more recently Nurtec and Ubrelvy did not help. Does note that week before her next Botox therapy, she has an increased frequency of headaches now maybe 3 times a week. No episodes of sudden loss of vision, trouble speaking, unilateral weakness or numbness of the face or extremities. She reports recently has come off an antidepressant which she was getting for control of hot flashes due to menopause.  But that was discontinued, now on Climara patch, estrogen.  With improvement of her hot flashes [Chronic Headache] : chronic headache [Aura] : no aura [Dizziness] : dizziness [Nausea] : nausea [Photophobia] : photophobia [Phonophobia] : phonophobia [Neck Pain] : neck pain [Scotoma] : no scotoma [Numbness] : no numbness [Tingling] : no tingling [Weakness] : no weakness [Scalp Tenderness] : no scalp tenderness [___ Times Per Week] : [unfilled] times each week [> 4 hours] : > 4 hours [improved] : improved [Stable] : The patient reports ~his/her~ symptoms since the last visit are stable

## 2024-05-01 NOTE — ASSESSMENT
[FreeTextEntry1] : 48-year-old woman history of chronic migraines, doing fairly well with Botox therapy, end of treatment breakthrough headaches. Reviewed and discussed options. 1 to be trying Qulipta but she prefers not to be on any new medication at the moment. Reviewed and discussed treatment options.  Nausea at this time. Patient will make a follow-up in 3 months.

## 2024-06-13 NOTE — ASSESSMENT
Goal Outcome Evaluation:    Neuro: A&Ox4.   Cardiac: SR. VSS.   Respiratory: Sating 98% on RA.  GI/: Adequate urine output. BM X1  Diet/appetite: Tolerating regular  diet. Eating well.  Activity: stand by assist up to chair and in halls.  Pain: At acceptable level on current regimen.   Skin: No new deficits noted.  LDA's: PIV patent and functioning.   Plan: Continue with POC. Notify primary team with changes.                         [FreeTextEntry1] : 46 year old woman hx of chronic migraines, tolerated Botox treatment well. headaches continue to be improved.\par Schedule next Botox appointment in 3 months.

## 2024-08-08 ENCOUNTER — APPOINTMENT (OUTPATIENT)
Dept: NEUROLOGY | Facility: CLINIC | Age: 49
End: 2024-08-08

## 2024-08-08 PROBLEM — G43.709 CHRONIC MIGRAINE WITHOUT AURA WITHOUT STATUS MIGRAINOSUS, NOT INTRACTABLE: Status: ACTIVE | Noted: 2024-08-08

## 2024-08-08 PROCEDURE — 64615 CHEMODENERV MUSC MIGRAINE: CPT

## 2024-08-08 PROCEDURE — 99213 OFFICE O/P EST LOW 20 MIN: CPT | Mod: 25

## 2024-08-08 NOTE — ASSESSMENT
[FreeTextEntry1] : 48-year-old woman right-handed history of chronic migraines, doing well, overall improved on Botox therapy.  Triptan or Ubrelvy as needed for relief of pain. Reviewed and discussed treatment, no change this time. Return to office, 3 months.

## 2024-08-08 NOTE — HISTORY OF PRESENT ILLNESS
[FreeTextEntry1] : 48-year-old woman history of chronic migraines, here for follow-up visit, and scheduled Botox appointment.  Overall reports that headaches are intermittent, good response to Botox therapy.  Uses rizatriptan 10 mg as needed, occasion will use Ubrelvy 100 mg if the rizatriptan does not help. Reports more recently dealing with hair loss, seeing a dermatologist getting scalp steroid injections which have so some problems.

## 2024-11-18 ENCOUNTER — APPOINTMENT (OUTPATIENT)
Dept: NEUROLOGY | Facility: CLINIC | Age: 49
End: 2024-11-18

## 2024-12-05 ENCOUNTER — APPOINTMENT (OUTPATIENT)
Dept: NEUROLOGY | Facility: CLINIC | Age: 49
End: 2024-12-05
Payer: COMMERCIAL

## 2024-12-05 PROCEDURE — 64615 CHEMODENERV MUSC MIGRAINE: CPT

## 2024-12-05 PROCEDURE — 99213 OFFICE O/P EST LOW 20 MIN: CPT | Mod: 25

## 2025-03-04 ENCOUNTER — APPOINTMENT (OUTPATIENT)
Dept: NEUROLOGY | Facility: CLINIC | Age: 50
End: 2025-03-04
Payer: COMMERCIAL

## 2025-03-04 PROCEDURE — 99213 OFFICE O/P EST LOW 20 MIN: CPT | Mod: 25

## 2025-03-04 PROCEDURE — 64615 CHEMODENERV MUSC MIGRAINE: CPT

## 2025-06-13 ENCOUNTER — APPOINTMENT (OUTPATIENT)
Dept: NEUROLOGY | Facility: CLINIC | Age: 50
End: 2025-06-13
Payer: COMMERCIAL

## 2025-06-13 PROCEDURE — 64615 CHEMODENERV MUSC MIGRAINE: CPT

## 2025-06-13 PROCEDURE — 99213 OFFICE O/P EST LOW 20 MIN: CPT | Mod: 25

## 2025-09-04 ENCOUNTER — APPOINTMENT (OUTPATIENT)
Dept: ORTHOPEDIC SURGERY | Facility: CLINIC | Age: 50
End: 2025-09-04
Payer: COMMERCIAL

## 2025-09-04 ENCOUNTER — NON-APPOINTMENT (OUTPATIENT)
Age: 50
End: 2025-09-04

## 2025-09-04 DIAGNOSIS — M25.561 PAIN IN RIGHT KNEE: ICD-10-CM

## 2025-09-04 PROCEDURE — 99204 OFFICE O/P NEW MOD 45 MIN: CPT

## 2025-09-04 PROCEDURE — 73564 X-RAY EXAM KNEE 4 OR MORE: CPT | Mod: RT

## 2025-09-15 ENCOUNTER — APPOINTMENT (OUTPATIENT)
Dept: NEUROLOGY | Facility: CLINIC | Age: 50
End: 2025-09-15
Payer: COMMERCIAL

## 2025-09-15 PROCEDURE — 99213 OFFICE O/P EST LOW 20 MIN: CPT | Mod: 25

## 2025-09-15 PROCEDURE — 64615 CHEMODENERV MUSC MIGRAINE: CPT
